# Patient Record
Sex: FEMALE | Race: WHITE | NOT HISPANIC OR LATINO | Employment: UNEMPLOYED
[De-identification: names, ages, dates, MRNs, and addresses within clinical notes are randomized per-mention and may not be internally consistent; named-entity substitution may affect disease eponyms.]

---
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
## 2023-01-03 ENCOUNTER — TELEPHONE (OUTPATIENT)
Dept: MEDICAL GROUP | Facility: CLINIC | Age: 18
End: 2023-01-03
Payer: MEDICAID

## 2023-01-03 NOTE — TELEPHONE ENCOUNTER
Patient called regarding her return to work note that she requesting last week. She said it should state she is able to return to work whenever. Thankyou

## 2023-01-09 ENCOUNTER — OFFICE VISIT (OUTPATIENT)
Dept: MEDICAL GROUP | Facility: CLINIC | Age: 18
End: 2023-01-09
Payer: MEDICAID

## 2023-01-09 DIAGNOSIS — F42.9 OBSESSIVE-COMPULSIVE DISORDER, UNSPECIFIED TYPE: ICD-10-CM

## 2023-01-09 PROCEDURE — 90834 PSYTX W PT 45 MINUTES: CPT | Performed by: PSYCHOLOGIST

## 2023-01-09 NOTE — PROGRESS NOTES
[facility]  Psychotherapy Summary Note    Full therapy note has been documented and is under restricted viewing.  Please see below for summary of today's session.     Patient Name: Kayla Duane  Patient MRN: [id number]  Today's Date:  1/9/23    Type of session: intake assessment  Session start time: 8  Session stop time: 8:45  Length of time spent face to face with patient: 45  Persons in attendance: patient    Diagnoses:   1. Obsessive-compulsive disorder, unspecified type       Pt. Is currently off work for depression and is ready for her note from her PCP to return to work.      Pt. Is a [employer] college student, interested in grad school in clinical psychology.    Lives with a friend in an apartment.  Happy with living situation.    Grew up around [place], herself got involved with drinking and has been sober for a year from alcohol.  Does use marijuana at times.    Long struggled with contamination OCD, which can spiral into depression.    No  SI.    Open to therapy for OCD/depression, but has never found therapy helpful.  Discussed ACT therapy as a possible alternative paradigm, recommended a book and I referred her to [facility], Dr. Lombardaro, and [facility].  And can f/u with this writer for support and consultation.        Na Cruz, Ph.D.

## 2023-01-13 ENCOUNTER — OFFICE VISIT (OUTPATIENT)
Dept: MEDICAL GROUP | Facility: CLINIC | Age: 18
End: 2023-01-13
Payer: MEDICAID

## 2023-01-13 DIAGNOSIS — F42.9 OBSESSIVE-COMPULSIVE DISORDER, UNSPECIFIED TYPE: ICD-10-CM

## 2023-01-13 PROCEDURE — 99213 OFFICE O/P EST LOW 20 MIN: CPT | Mod: GT,GE | Performed by: STUDENT IN AN ORGANIZED HEALTH CARE EDUCATION/TRAINING PROGRAM

## 2023-01-13 RX ORDER — FLUOXETINE 10 MG/1
10 CAPSULE ORAL DAILY
Qty: 30 CAPSULE | Refills: 2 | Status: SHIPPED | OUTPATIENT
Start: 2023-01-13 | End: 2023-02-07

## 2023-01-13 NOTE — PROGRESS NOTES
Virtual Visit: Established Patient   This visit was conducted via  telelphone  using secure and encrypted videoconferencing technology.   The patient was in their home in the [facility].    The patient's identity was confirmed and verbal consent was obtained for this virtual visit.    Subjective:   CC:   Chief Complaint   Patient presents with    Other     Telephone visit.      Kayla Duane is a 17 y.o. female presenting for evaluation and management of:    Patient is a 17 y.o. female. This pleasant patient is here today  as follow up for mental health.  She has had time off work to concentrate on her mental health. This is her 2nd visit with me, has seen Dr. CRAWFORD and has a psychiatry appointment in 3 weeks. Due to the severity of symptoms, will start on Prozac, well tolerated previously. She felt it did help, was titrate up to 20mg but then self-discontinued due to worsening of symptoms. She feels her intrusive thoughts are the same, will reach out to myself, Dr. CRAWFORD or a [facility] ER if her thoughts/symptoms worsen.   She feels ready to go back to work, forms filled out for her missed work, return to work 01/14/2023.  Will also write a separate return to work letter in addition to the [employer] forms.     ROS   Gen: no fever/chills  CV: No chest pain  Resp: No SOB  GI/: No bowel or bladder complaints  Psych: see HPI      Current medicines (including changes today)  Current Outpatient Medications   Medication Sig Dispense Refill    medroxyPROGESTERone (DEPO-PROVERA) 150 MG/ML Suspension DEPO-PROVERA 150 MG/ML SUSP 1 mL 0    traZODone (DESYREL) 50 MG Tab TAKE 1/2 TO 1 TABLET BY MOUTH AT BEDTIME AS NEEDED FOR SLEEP 30 Tablet 1    ibuprofen (MOTRIN) 600 MG Tab Take 1 Tablet by mouth every 6 hours as needed. 30 Tablet 0     No current facility-administered medications for this visit.       Patient Active Problem List    Diagnosis Date Noted    Recurrent streptococcal tonsillitis 04/26/2022        Objective:   There were no  vitals taken for this visit.    Physical Exam:limited by Telephone visit  Alert, linear, fluent speech. Low volume, a bit slow.  Speaking in full sentences, no coughing.     Assessment and Plan:   The following treatment plan was discussed:     1. Obsessive-compulsive disorder, unspecified type    RX fluoxetine, will follow up with Psychiatry.  Needs new provider in this office, knows to make appointment.  Dr. CRAWFORD and myself available if symptoms/condition worsens.    Follow-up: No follow-ups on file.

## 2023-01-13 NOTE — LETTER
January 13, 2023    To Whom It May Concern:         This is confirmation that Kayla Duane attended her scheduled appointment with Hellen Collado M.D. on 1/13/23, she is under my care and that of [facility]. She is clear to return to work on 01/14/2023.         If you have any questions please do not hesitate to call me at the phone number listed below.    Sincerely,        Electronically Signed  Hellen Collado M.D.  [phone]

## 2023-01-13 NOTE — PROGRESS NOTES
HPI:  Patient is a 17 y.o. female. This pleasant patient is here today  as follow up for mental health.  She has had time off work to concentrate on her mental health. This is her 2nd visit with me, has seen Dr. CRAWFORD and has a psychiatry appointment in 3 weeks. Due to the severity of symptoms, will start on Prozac, well tolerated previously. She felt it did help, was titrate up to 20mg but then self-discontinued due to worsening of symptoms. She feels her intrusive thoughts are the same, will reach out to myself, Dr. CRAWFORD or a [facility] if her thoughts/symptoms worsen.   She feels ready to go back to work.      No problems updated.                                                                                                                               Patient Active Problem List    Diagnosis Date Noted    Recurrent streptococcal tonsillitis 04/26/2022       Current Outpatient Medications   Medication Sig Dispense Refill    medroxyPROGESTERone (DEPO-PROVERA) 150 MG/ML Suspension DEPO-PROVERA 150 MG/ML SUSP 1 mL 0    traZODone (DESYREL) 50 MG Tab TAKE 1/2 TO 1 TABLET BY MOUTH AT BEDTIME AS NEEDED FOR SLEEP 30 Tablet 1    ibuprofen (MOTRIN) 600 MG Tab Take 1 Tablet by mouth every 6 hours as needed. 30 Tablet 0     No current facility-administered medications for this visit.         Allergies as of 01/13/2023 - Reviewed 01/13/2023   Allergen Reaction Noted    Amoxicillin Hives 08/10/2015    Penicillins Hives 02/21/2022          There were no vitals taken for this visit.    Gen: no fever/chills  CV: No chest pain  Resp: No SOB  GI/: No bowel or bladder complaints  Psych: see HPI      Physical Exam:  Limited by     Assessment and Plan    17 y.o. female with the following-  Problem List Items Addressed This Visit    None  Visit Diagnoses       Obsessive-compulsive disorder, unspecified type               No follow-ups on file.    Hellen Collado M.D.

## 2023-02-07 ENCOUNTER — OFFICE VISIT (OUTPATIENT)
Dept: BEHAVIORAL HEALTH | Facility: PSYCHIATRIC FACILITY | Age: 18
End: 2023-02-07
Payer: MEDICAID

## 2023-02-07 VITALS
HEART RATE: 67 BPM | BODY MASS INDEX: 30.39 KG/M2 | DIASTOLIC BLOOD PRESSURE: 63 MMHG | HEIGHT: 65 IN | WEIGHT: 182.4 LBS | OXYGEN SATURATION: 97 % | SYSTOLIC BLOOD PRESSURE: 119 MMHG

## 2023-02-07 DIAGNOSIS — F42.9 OBSESSIVE-COMPULSIVE DISORDER, UNSPECIFIED TYPE: ICD-10-CM

## 2023-02-07 DIAGNOSIS — F19.94 SUBSTANCE INDUCED MOOD DISORDER (HCC): ICD-10-CM

## 2023-02-07 DIAGNOSIS — F12.90 CANNABIS USE DISORDER: ICD-10-CM

## 2023-02-07 PROCEDURE — 90792 PSYCH DIAG EVAL W/MED SRVCS: CPT | Mod: GC | Performed by: STUDENT IN AN ORGANIZED HEALTH CARE EDUCATION/TRAINING PROGRAM

## 2023-02-07 ASSESSMENT — ENCOUNTER SYMPTOMS
HEMOPTYSIS: 0
FEVER: 0
DIARRHEA: 0
CONSTIPATION: 0
SHORTNESS OF BREATH: 0
NAUSEA: 0
VOMITING: 0
TREMORS: 0
CHILLS: 0
BLOOD IN STOOL: 0
BLURRED VISION: 0
PALPITATIONS: 0
HEADACHES: 1
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
## 2023-03-07 ENCOUNTER — APPOINTMENT (OUTPATIENT)
Dept: BEHAVIORAL HEALTH | Facility: PSYCHIATRIC FACILITY | Age: 18
End: 2023-03-07
Payer: MEDICAID

## 2023-03-07 DIAGNOSIS — F19.94 SUBSTANCE INDUCED MOOD DISORDER (HCC): Primary | ICD-10-CM

## 2023-03-07 DIAGNOSIS — F42.9 OBSESSIVE-COMPULSIVE DISORDER, UNSPECIFIED TYPE: ICD-10-CM

## 2023-03-17 ENCOUNTER — NON-PROVIDER VISIT (OUTPATIENT)
Dept: MEDICAL GROUP | Facility: CLINIC | Age: 18
End: 2023-03-17
Payer: MEDICAID

## 2023-03-17 DIAGNOSIS — Z30.8 ENCOUNTER FOR OTHER CONTRACEPTIVE MANAGEMENT: Primary | ICD-10-CM

## 2023-03-17 LAB
POCT INT CON NEG: NEGATIVE
POCT INT CON POS: POSITIVE
POCT URINE PREGNANCY TEST: NEGATIVE

## 2023-03-17 PROCEDURE — 96372 THER/PROPH/DIAG INJ SC/IM: CPT | Performed by: FAMILY MEDICINE

## 2023-03-17 PROCEDURE — 81025 URINE PREGNANCY TEST: CPT | Performed by: FAMILY MEDICINE

## 2023-03-17 RX ORDER — MEDROXYPROGESTERONE ACETATE 150 MG/ML
150 INJECTION, SUSPENSION INTRAMUSCULAR ONCE
Status: COMPLETED | OUTPATIENT
Start: 2023-03-17 | End: 2023-03-17

## 2023-03-17 RX ADMIN — MEDROXYPROGESTERONE ACETATE 150 MG: 150 INJECTION, SUSPENSION INTRAMUSCULAR at 10:11

## 2023-03-17 NOTE — PROGRESS NOTES
Kayla Duane is a 17 y.o. here for a Depo Provera Injection. She  had gated in 12/23/2022 , we did Pregnancy test because was note documented . Patient is in the window to get the depo shot today (march10th and mach 24th)    Date of last Depo Provera Injection: 12/23/2022 ????  Current date within therapeutic range?: Yes   Urine pregnancy test done (needed if out of date range): yes--Negative   Date of last office visit:12/23/22  Date of last pap (if > 21 years old)/ GYN exam: unknow  Dx: Family planning    Patient tolerated injection and no adverse effects were observed or reported: Yes    # of Administrations remaining in MAR: 0  Next injection due between Agust 2th and Agust 16th.

## 2023-04-04 ENCOUNTER — APPOINTMENT (OUTPATIENT)
Dept: BEHAVIORAL HEALTH | Facility: PSYCHIATRIC FACILITY | Age: 18
End: 2023-04-04
Payer: MEDICAID

## 2023-06-16 ENCOUNTER — NON-PROVIDER VISIT (OUTPATIENT)
Dept: MEDICAL GROUP | Facility: CLINIC | Age: 18
End: 2023-06-16
Payer: MEDICAID

## 2023-06-16 DIAGNOSIS — Z30.8 ENCOUNTER FOR OTHER CONTRACEPTIVE MANAGEMENT: Primary | ICD-10-CM

## 2023-06-16 PROCEDURE — 96372 THER/PROPH/DIAG INJ SC/IM: CPT | Performed by: FAMILY MEDICINE

## 2023-06-16 RX ORDER — MEDROXYPROGESTERONE ACETATE 150 MG/ML
150 INJECTION, SUSPENSION INTRAMUSCULAR ONCE
Status: COMPLETED | OUTPATIENT
Start: 2023-06-16 | End: 2023-06-16

## 2023-06-16 RX ADMIN — MEDROXYPROGESTERONE ACETATE 150 MG: 150 INJECTION, SUSPENSION INTRAMUSCULAR at 10:12

## 2023-06-16 NOTE — PROGRESS NOTES
Kayla Duane is a 17 y.o. here for a Depo Provera Injection.     Date of last Depo Provera Injection: 3/17/2023  Current date within therapeutic range?: Yes   Urine pregnancy test done (needed if out of date range): no  Date of last office visit:1/13/2023  Date of last pap (if > 21 years old)/ GYN exam: n/a  Dx: Contraceptive use    Patient tolerated injection and no adverse effects were observed or reported: Yes    # of Administrations remaining in MAR: none  Next injection due between sept 1 and sept 15.

## 2023-09-12 ENCOUNTER — OFFICE VISIT (OUTPATIENT)
Dept: MEDICAL GROUP | Facility: CLINIC | Age: 18
End: 2023-09-12
Payer: MEDICAID

## 2023-09-12 VITALS
WEIGHT: 206.3 LBS | HEIGHT: 65 IN | BODY MASS INDEX: 34.37 KG/M2 | DIASTOLIC BLOOD PRESSURE: 77 MMHG | HEART RATE: 83 BPM | TEMPERATURE: 98.3 F | OXYGEN SATURATION: 95 % | SYSTOLIC BLOOD PRESSURE: 116 MMHG

## 2023-09-12 DIAGNOSIS — R53.82 CHRONIC FATIGUE: ICD-10-CM

## 2023-09-12 DIAGNOSIS — Z30.09 BIRTH CONTROL COUNSELING: ICD-10-CM

## 2023-09-12 DIAGNOSIS — R06.83 LOUD SNORING: ICD-10-CM

## 2023-09-12 PROCEDURE — 99213 OFFICE O/P EST LOW 20 MIN: CPT | Mod: GE

## 2023-09-12 PROCEDURE — 3078F DIAST BP <80 MM HG: CPT

## 2023-09-12 PROCEDURE — 3074F SYST BP LT 130 MM HG: CPT

## 2023-09-12 RX ORDER — ALBUTEROL SULFATE 90 UG/1
AEROSOL, METERED RESPIRATORY (INHALATION)
COMMUNITY
Start: 2023-08-25

## 2023-09-12 RX ORDER — BENZONATATE 200 MG/1
CAPSULE ORAL
COMMUNITY
Start: 2023-06-21 | End: 2024-03-11

## 2023-09-12 RX ORDER — DEXTROMETHORPHAN HYDROBROMIDE AND PROMETHAZINE HYDROCHLORIDE 15; 6.25 MG/5ML; MG/5ML
SYRUP ORAL
COMMUNITY
Start: 2023-06-21 | End: 2024-03-11

## 2023-09-12 NOTE — PROGRESS NOTES
Subjective:     CC: Chronic fatigue, birth control changes    HPI:   Tianna presents today with chronic fatigue.  Patient reports that she has been dealing with chronic fatigue for which she feels like this majority of her life.  She states that she will sleep almost 10 to 12 hours every night but still feels significantly fatigued throughout the day.  She also reports chronic bilateral frontal headache pains almost daily.  She endorses loud snoring throughout the night.  Denies that she wakes up gasping for breath and denies that she has been observed to be holding her breath throughout the night.  Patient is concerned that she may have sleep apnea and would like further evaluation.  She denies history or work-up for thyroid disorders or anemia.  She does currently take trazodone 50 mg as needed for insomnia.  Patient reports that she will  have insomnia at times due to racing thoughts. She currently is not on any medications for anxiety or depression.  She does have history of OCD.     Patient reports she would like to donate her eggs and she was advised that she needs to stop Depot shots prior to starting this process. She is not in a relationship and is not currently sexually active.     Current Outpatient Medications Ordered in Epic   Medication Sig Dispense Refill    albuterol 108 (90 Base) MCG/ACT Aero Soln inhalation aerosol INHALE 2 PUFFS EVERY 4 HOURS AS NEEDED FOR COUGH FOR WHEEZE OR FOR SHORTNESS OF BREATH      medroxyPROGESTERone (DEPO-PROVERA) 150 MG/ML Suspension DEPO-PROVERA 150 MG/ML SUSP 1 mL 0    traZODone (DESYREL) 50 MG Tab TAKE 1/2 TO 1 TABLET BY MOUTH AT BEDTIME AS NEEDED FOR SLEEP 30 Tablet 1    promethazine-dextromethorphan (PROMETHAZINE-DM) 6.25-15 MG/5ML syrup TAKE 5 ML BY MOUTH AT BEDTIME FOR 7 DAYS AS NEEDED FOR COUGH (Patient not taking: Reported on 9/12/2023)      benzonatate (TESSALON) 200 MG capsule TAKE 1 CAPSULE BY MOUTH 3 TIMES A DAY FOR 5 DAYS AS NEEDED FOR COUGH (Patient not  "taking: Reported on 9/12/2023)       No current [facility]-ordered facility-administered medications on file.       ROS:  Gen: no fevers/chills, no changes in weight  Pulm: no sob, no cough  CV: no chest pain, no palpitations  GI: no nausea/vomiting, no diarrhea    Objective:     Exam:  /77 (BP Location: Left arm, Patient Position: Sitting, BP Cuff Size: Adult)   Pulse 83   Temp 36.8 °C (98.3 °F) (Temporal)   Ht 1.651 m (5' 5\")   Wt 93.6 kg (206 lb 4.8 oz)   SpO2 95%   BMI 34.33 kg/m²  Body mass index is 34.33 kg/m².    Gen: Alert and oriented, No apparent distress.  Neck: Neck is supple without lymphadenopathy.  Lungs: Normal effort, CTA bilaterally, no wheezes, rhonchi, or rales  CV: Regular rate and rhythm. No murmurs, rubs, or gallops.  Ext: No clubbing, cyanosis, edema.    Labs: None     Assessment & Plan:     18 y.o. female with the following -     1. Chronic fatigue  Pt with history of chronic fatigue. This has been occurring for years. No previous lab work up. Does have loud snoring and headaches almost daily. Pt is requesting sleep apnea tests. Will get labs for further work up as well as send for possible sleep study.   - Comp Metabolic Panel; Future  - CBC WITH DIFFERENTIAL; Future  - TSH WITH REFLEX TO FT4; Future  - Referral to Pulmonary and Sleep Medicine    2. BMI 34.0-34.9,adult  Pt has elevated BMI, has not been previously screened for cholesterol or A1c. Will order and follow up.   - HEMOGLOBIN A1C; Future  - Lipid Profile; Future    3. Loud snoring  Pt has loud snoring, chronic problem. See above, will have patient be further evaluated by Sleep Medicine.   - Referral to Pulmonary and Sleep Medicine     4. Birth Control Counseling   Patient would like to have birth control discontinued for egg donation. Advised patient to use condoms in the interim. Will discontinue depot shot for now. Follow up as needed for birth control in the future.     Nunu Hackett M.D.  PGY-2    "

## 2023-10-23 RX ORDER — ACETAMINOPHEN AND CODEINE PHOSPHATE 120; 12 MG/5ML; MG/5ML
1 SOLUTION ORAL DAILY
Qty: 28 TABLET | Refills: 3 | Status: SHIPPED | OUTPATIENT
Start: 2023-10-23 | End: 2024-03-02

## 2023-11-03 ENCOUNTER — OFFICE VISIT (OUTPATIENT)
Dept: URGENT CARE | Facility: CLINIC | Age: 18
End: 2023-11-03
Payer: MEDICAID

## 2023-11-03 VITALS
HEART RATE: 104 BPM | WEIGHT: 213.4 LBS | SYSTOLIC BLOOD PRESSURE: 102 MMHG | OXYGEN SATURATION: 96 % | RESPIRATION RATE: 12 BRPM | BODY MASS INDEX: 34.3 KG/M2 | DIASTOLIC BLOOD PRESSURE: 58 MMHG | HEIGHT: 66 IN | TEMPERATURE: 98.2 F

## 2023-11-03 DIAGNOSIS — J02.9 SORE THROAT: ICD-10-CM

## 2023-11-03 DIAGNOSIS — J06.9 VIRAL URI: Primary | ICD-10-CM

## 2023-11-03 DIAGNOSIS — R50.9 FEVER, UNSPECIFIED FEVER CAUSE: ICD-10-CM

## 2023-11-03 LAB
FLUAV RNA SPEC QL NAA+PROBE: NEGATIVE
FLUBV RNA SPEC QL NAA+PROBE: NEGATIVE
RSV RNA SPEC QL NAA+PROBE: NEGATIVE
S PYO DNA SPEC NAA+PROBE: NOT DETECTED
SARS-COV-2 RNA RESP QL NAA+PROBE: NEGATIVE

## 2023-11-03 PROCEDURE — 87637 SARSCOV2&INF A&B&RSV AMP PRB: CPT | Mod: QW | Performed by: PHYSICIAN ASSISTANT

## 2023-11-03 PROCEDURE — 87651 STREP A DNA AMP PROBE: CPT | Performed by: PHYSICIAN ASSISTANT

## 2023-11-03 PROCEDURE — 99203 OFFICE O/P NEW LOW 30 MIN: CPT | Performed by: PHYSICIAN ASSISTANT

## 2023-11-03 PROCEDURE — 3074F SYST BP LT 130 MM HG: CPT | Performed by: PHYSICIAN ASSISTANT

## 2023-11-03 PROCEDURE — 3078F DIAST BP <80 MM HG: CPT | Performed by: PHYSICIAN ASSISTANT

## 2023-11-03 ASSESSMENT — ENCOUNTER SYMPTOMS
ABDOMINAL PAIN: 1
NAUSEA: 1
DIARRHEA: 0
SHORTNESS OF BREATH: 0
MYALGIAS: 1
CHILLS: 1
VOMITING: 0
SORE THROAT: 1
HEADACHES: 1
FEVER: 1
SPUTUM PRODUCTION: 1
COUGH: 1

## 2023-11-03 NOTE — PROGRESS NOTES
Subjective     Kayla Duane is a 18 y.o. female who presents with Sore Throat (Pt has a sore throat, headache, ear discomfort, stomach pain x 3 days )    HPI:  Kayla Duane is a 18 y.o. female who presents today for evaluation of sore throat and flulike symptoms.  Patient notes that she has been feeling sick for the past 3 to 4 days.  She notes that she has had fever up to 101 °F, chills, body aches, sore throat, headache, congestion, cough, abdominal discomfort.  She has been mostly just drinking fluids and trying to rest but has occasionally taken some OTC NSAIDs to help with the headache.        Review of Systems   Constitutional:  Positive for chills, fever and malaise/fatigue.   HENT:  Positive for congestion, ear pain and sore throat.    Respiratory:  Positive for cough and sputum production. Negative for shortness of breath.    Gastrointestinal:  Positive for abdominal pain and nausea. Negative for diarrhea and vomiting.   Musculoskeletal:  Positive for myalgias.   Neurological:  Positive for headaches.           PMH:  has no past medical history on file.  MEDS:   Current Outpatient Medications:     albuterol 108 (90 Base) MCG/ACT Aero Soln inhalation aerosol, INHALE 2 PUFFS EVERY 4 HOURS AS NEEDED FOR COUGH FOR WHEEZE OR FOR SHORTNESS OF BREATH, Disp: , Rfl:     traZODone (DESYREL) 50 MG Tab, TAKE 1/2 TO 1 TABLET BY MOUTH AT BEDTIME AS NEEDED FOR SLEEP, Disp: 30 Tablet, Rfl: 1    norethindrone (MICRONOR) 0.35 MG tablet, Take 1 Tablet by mouth every day., Disp: 28 Tablet, Rfl: 3    promethazine-dextromethorphan (PROMETHAZINE-DM) 6.25-15 MG/5ML syrup, TAKE 5 ML BY MOUTH AT BEDTIME FOR 7 DAYS AS NEEDED FOR COUGH (Patient not taking: Reported on 9/12/2023), Disp: , Rfl:     benzonatate (TESSALON) 200 MG capsule, TAKE 1 CAPSULE BY MOUTH 3 TIMES A DAY FOR 5 DAYS AS NEEDED FOR COUGH (Patient not taking: Reported on 9/12/2023), Disp: , Rfl:   ALLERGIES:   Allergies   Allergen Reactions    Amoxicillin Hives     Other  "reaction(s): HIVES    Penicillins Hives     SURGHX: No past surgical history on file.  SOCHX:  reports that she has quit smoking. She has never used smokeless tobacco. She reports current alcohol use. She reports current drug use. Drug: Marijuana.  FH: Family history was reviewed, no pertinent findings to report        Objective     /58 (BP Location: Left arm, Patient Position: Sitting, BP Cuff Size: Large adult)   Pulse (!) 104   Temp 36.8 °C (98.2 °F) (Temporal)   Resp 12   Ht 1.676 m (5' 6\")   Wt 96.8 kg (213 lb 6.4 oz)   SpO2 96%   BMI 34.44 kg/m²      Physical Exam  Constitutional:       Appearance: She is well-developed.   HENT:      Head: Normocephalic and atraumatic.      Right Ear: Tympanic membrane, ear canal and external ear normal.      Left Ear: Tympanic membrane, ear canal and external ear normal.      Nose: Mucosal edema, congestion and rhinorrhea present. Rhinorrhea is clear.      Mouth/Throat:      Lips: Pink.      Pharynx: Uvula midline. Posterior oropharyngeal erythema present. No oropharyngeal exudate or uvula swelling.   Eyes:      Conjunctiva/sclera: Conjunctivae normal.      Pupils: Pupils are equal, round, and reactive to light.   Cardiovascular:      Rate and Rhythm: Normal rate and regular rhythm.      Heart sounds: Normal heart sounds. No murmur heard.  Pulmonary:      Effort: Pulmonary effort is normal.      Breath sounds: Normal breath sounds. No decreased breath sounds, wheezing, rhonchi or rales.   Musculoskeletal:      Cervical back: Normal range of motion.   Lymphadenopathy:      Cervical: Cervical adenopathy present.   Skin:     General: Skin is warm and dry.      Capillary Refill: Capillary refill takes less than 2 seconds.   Neurological:      Mental Status: She is alert and oriented to person, place, and time.   Psychiatric:         Behavior: Behavior normal.         Judgment: Judgment normal.       POCT GROUP A STREP, PCR - Negative      POCT CoV-2, Flu A/B, RSV by " PCR - Negative      Assessment & Plan     1. Sore throat  - POCT GROUP A STREP, PCR  - POCT CoV-2, Flu A/B, RSV by PCR  -Supportive care discussed to include salt water gargles, throat lozenges, and increased fluid intake    2. Fever, unspecified fever cause  - POCT GROUP A STREP, PCR  - POCT CoV-2, Flu A/B, RSV by PCR    3. Viral URI  - OTC cold/flu medications  -Supportive care also discussed to include the use of saline nasal rinses, steam inhalation, and the use of a cool-mist humidifier in the bedroom at night.  - PO fluids  - Rest  - Tylenol or ibuprofen as needed for fever > 100.4 F                    Differential Diagnosis, natural history, and supportive care discussed. Return to the Urgent Care or follow up with your PCP if symptoms fail to resolve, or for any new or worsening symptoms. Emergency room precautions discussed. Patient and/or family appears understanding of information.

## 2023-11-03 NOTE — LETTER
November 3, 2023         Patient: Kayla Duane   YOB: 2005   Date of Visit: 11/3/2023           To Whom it May Concern:    Kayla Duane was seen in my clinic on 11/3/2023.  Please excuse her absence from work tomorrow.  She may return to work on Sunday, 11/5/2023, as long as she has been fever free for a full 24 hours without the use of fever reducing medications.      Sincerely,           Micaela Webster P.A.-C.  Electronically Signed

## 2023-11-03 NOTE — LETTER
November 3, 2023         Patient: Kayla Duane   YOB: 2005   Date of Visit: 11/3/2023           To Whom it May Concern:    Kayla Duane was seen in my clinic on 11/3/2023.  Please excuse her absence from school today.      Sincerely,           Micaela Webster P.A.-C.  Electronically Signed

## 2024-01-05 ENCOUNTER — APPOINTMENT (OUTPATIENT)
Dept: URGENT CARE | Facility: CLINIC | Age: 19
End: 2024-01-05
Payer: MEDICAID

## 2024-02-27 NOTE — TELEPHONE ENCOUNTER
Received request via: Pharmacy    Was the patient seen in the last year in this department? Yes    Does the patient have an active prescription (recently filled or refills available) for medication(s) requested? No    Pharmacy Name: [facility]    Does the patient have [facility] Plus and need 100 day supply (blood pressure, diabetes and cholesterol meds only)? Patient does not have SCP

## 2024-03-02 RX ORDER — NORETHINDRONE 0.35 MG/1
1 TABLET ORAL
Qty: 28 TABLET | Refills: 3 | Status: SHIPPED | OUTPATIENT
Start: 2024-03-02 | End: 2024-03-11

## 2024-03-09 ENCOUNTER — OFFICE VISIT (OUTPATIENT)
Dept: URGENT CARE | Facility: CLINIC | Age: 19
End: 2024-03-09
Payer: MEDICAID

## 2024-03-09 VITALS
TEMPERATURE: 98.2 F | OXYGEN SATURATION: 97 % | RESPIRATION RATE: 16 BRPM | SYSTOLIC BLOOD PRESSURE: 106 MMHG | DIASTOLIC BLOOD PRESSURE: 62 MMHG | WEIGHT: 209.9 LBS | BODY MASS INDEX: 33.73 KG/M2 | HEIGHT: 66 IN | HEART RATE: 85 BPM

## 2024-03-09 DIAGNOSIS — R21 RASH AND NONSPECIFIC SKIN ERUPTION: ICD-10-CM

## 2024-03-09 DIAGNOSIS — J03.90 TONSILLITIS: ICD-10-CM

## 2024-03-09 DIAGNOSIS — B33.8 RSV INFECTION: ICD-10-CM

## 2024-03-09 LAB
FLUAV RNA SPEC QL NAA+PROBE: NEGATIVE
FLUBV RNA SPEC QL NAA+PROBE: NEGATIVE
RSV RNA SPEC QL NAA+PROBE: POSITIVE
S PYO DNA SPEC NAA+PROBE: NOT DETECTED
SARS-COV-2 RNA RESP QL NAA+PROBE: NEGATIVE

## 2024-03-09 PROCEDURE — 87637 SARSCOV2&INF A&B&RSV AMP PRB: CPT | Mod: QW | Performed by: STUDENT IN AN ORGANIZED HEALTH CARE EDUCATION/TRAINING PROGRAM

## 2024-03-09 PROCEDURE — 3074F SYST BP LT 130 MM HG: CPT | Performed by: STUDENT IN AN ORGANIZED HEALTH CARE EDUCATION/TRAINING PROGRAM

## 2024-03-09 PROCEDURE — 3078F DIAST BP <80 MM HG: CPT | Performed by: STUDENT IN AN ORGANIZED HEALTH CARE EDUCATION/TRAINING PROGRAM

## 2024-03-09 PROCEDURE — 99213 OFFICE O/P EST LOW 20 MIN: CPT | Performed by: STUDENT IN AN ORGANIZED HEALTH CARE EDUCATION/TRAINING PROGRAM

## 2024-03-09 PROCEDURE — 87651 STREP A DNA AMP PROBE: CPT | Performed by: STUDENT IN AN ORGANIZED HEALTH CARE EDUCATION/TRAINING PROGRAM

## 2024-03-09 RX ORDER — DULOXETIN HYDROCHLORIDE 20 MG/1
CAPSULE, DELAYED RELEASE ORAL
COMMUNITY
Start: 2024-02-29

## 2024-03-09 NOTE — PROGRESS NOTES
"Subjective:   Kayla Duane is a 18 y.o. female who presents for Rash (X3 days developed rash on chest and abdomin/ vomiting/ nausea/ cough/ congestion/ phlegm is black, green, brown/ headache/ body aches/ chills/ took covid test yesterday and was negative at home )      HPI:  18-year-old female presents to urgent care for 3 days of a rash to her abdomen and chest.  Also has had some nausea and vomiting.  No vomiting today.  Also experiencing a cough, sore throat, headache, and mild nasal congestion.  No one else at home with similar symptoms.  States that she does have a long history of strep throat.      Medications:    albuterol Aers  benzonatate  DULoxetine Cpep  Dary Tabs  promethazine-dextromethorphan  traZODone Tabs    Allergies: Amoxicillin and Penicillins    Problem List: Kayla Duane does not have any pertinent problems on file.    Surgical History:  No past surgical history on file.    Past Social Hx: Kayla Duane  reports that she has quit smoking. She has never used smokeless tobacco. She reports that she does not currently use alcohol. She reports current drug use. Drug: Marijuana.     Past Family Hx:  Kayla Duane family history is not on file.     Problem list, medications, and allergies reviewed by myself today in Epic.     Objective:     /62 (BP Location: Left arm, Patient Position: Sitting, BP Cuff Size: Adult)   Pulse 85   Temp 36.8 °C (98.2 °F) (Temporal)   Resp 16   Ht 1.676 m (5' 6\")   Wt 95.2 kg (209 lb 14.4 oz)   SpO2 97%   BMI 33.88 kg/m²     Physical Exam  Vitals reviewed.   Constitutional:       Appearance: Normal appearance.   HENT:      Right Ear: Tympanic membrane, ear canal and external ear normal.      Left Ear: Tympanic membrane, ear canal and external ear normal.      Nose: Congestion present.      Mouth/Throat:      Mouth: Mucous membranes are moist.      Pharynx: Uvula midline. Posterior oropharyngeal erythema present. No oropharyngeal exudate.      Tonsils: No tonsillar " exudate. 1+ on the right. 1+ on the left.   Cardiovascular:      Rate and Rhythm: Normal rate and regular rhythm.      Pulses: Normal pulses.      Heart sounds: Normal heart sounds. No murmur heard.  Pulmonary:      Effort: Pulmonary effort is normal. No respiratory distress.      Breath sounds: Normal breath sounds. No stridor. No wheezing, rhonchi or rales.   Musculoskeletal:      Cervical back: Normal range of motion.   Lymphadenopathy:      Cervical: Cervical adenopathy present.   Neurological:      Mental Status: She is alert.         Lab Results/POC Test Results   Results for orders placed or performed in visit on 03/09/24   POCT GROUP A STREP, PCR   Result Value Ref Range    POC Group A Strep, PCR Not Detected Not Detected, Invalid   POCT CoV-2, Flu A/B, RSV by PCR   Result Value Ref Range    SARS-CoV-2 by PCR Negative Negative, Invalid    Influenza virus A RNA Negative Negative, Invalid    Influenza virus B, PCR Negative Negative, Invalid    RSV, PCR Positive (A) Negative, Invalid           Assessment/Plan:     Diagnosis and associated orders:     1. Tonsillitis  POCT GROUP A STREP, PCR    POCT CoV-2, Flu A/B, RSV by PCR      2. Rash and nonspecific skin eruption        3. RSV infection           Comments/MDM:     RSV positive.  Patient's presentation physical exam findings are consistent with this diagnosis.  This should be self-limited.  No indication for antibiotics at this time.  Rash consistent with viral exanthem.  Rash not consistent with bacterial infection or fungal.  No vesicular rash.  Pulmonary exam shows no wheezing, rales, rhonchi.  No signs of pneumonia.  Discussed typical timeframe for resolution of symptoms.  Discussed home supportive care.  Vitals are stable.  No respiratory distress or chest pain.  Return precautions given.         Differential diagnosis, natural history, supportive care, and indications for immediate follow-up discussed.    Advised the patient to follow-up with the  primary care physician for recheck, reevaluation, and consideration of further management.    Please note that this dictation was created using voice recognition software. I have made a reasonable attempt to correct obvious errors, but I expect that there are errors of grammar and possibly content that I did not discover before finalizing the note.    Electronically signed by Raman Ash PA-C.

## 2024-03-09 NOTE — LETTER
March 9, 2024    To Whom It May Concern:         This is confirmation that Kayla Duane attended her scheduled appointment with Raman Ash P.A.-C. on 3/09/24.  Excused from work on 3/9/2024 through 3/10/2024.  May return to work early if feeling better.         If you have any questions please do not hesitate to call me at the phone number listed below.    Sincerely,          Raman Ash P.A.-C.  [phone]

## 2024-03-11 ENCOUNTER — OFFICE VISIT (OUTPATIENT)
Dept: URGENT CARE | Facility: CLINIC | Age: 19
End: 2024-03-11
Payer: MEDICAID

## 2024-03-11 VITALS
HEIGHT: 66 IN | BODY MASS INDEX: 33.59 KG/M2 | HEART RATE: 81 BPM | TEMPERATURE: 96.9 F | OXYGEN SATURATION: 98 % | SYSTOLIC BLOOD PRESSURE: 122 MMHG | WEIGHT: 209 LBS | DIASTOLIC BLOOD PRESSURE: 68 MMHG | RESPIRATION RATE: 16 BRPM

## 2024-03-11 DIAGNOSIS — B33.8 RSV (RESPIRATORY SYNCYTIAL VIRUS INFECTION): ICD-10-CM

## 2024-03-11 PROCEDURE — 99212 OFFICE O/P EST SF 10 MIN: CPT | Performed by: FAMILY MEDICINE

## 2024-03-11 PROCEDURE — 3074F SYST BP LT 130 MM HG: CPT | Performed by: FAMILY MEDICINE

## 2024-03-11 PROCEDURE — 3078F DIAST BP <80 MM HG: CPT | Performed by: FAMILY MEDICINE

## 2024-03-11 ASSESSMENT — ENCOUNTER SYMPTOMS
COUGH: 1
FEVER: 0

## 2024-03-11 NOTE — PROGRESS NOTES
"Subjective:     Kayla Fay-Ann Duane is a 18 y.o. female who presents for Cough (Diagnosed with RSV on Saturday, needs school note ) and Letter for School/Work    HPI  Patient here for school note  Has been evaluated in this office a few days ago and diagnosed with RSV  Still feeling poorly and needs note for school  Overall does feel she is making improvements  Continues to have cough and stuffy nose  No new symptoms since last visit    Review of Systems   Constitutional:  Negative for fever.   Respiratory:  Positive for cough.    Skin:  Negative for rash.     PMH:  has no past medical history on file.  MEDS:   Current Outpatient Medications:     DULoxetine (CYMBALTA) 20 MG Cap DR Particles, TAKE 1 CAPSULE ORALLY DAILY FOR MOOD/ANXIETY, Disp: , Rfl:     albuterol 108 (90 Base) MCG/ACT Aero Soln inhalation aerosol, INHALE 2 PUFFS EVERY 4 HOURS AS NEEDED FOR COUGH FOR WHEEZE OR FOR SHORTNESS OF BREATH, Disp: , Rfl:     traZODone (DESYREL) 50 MG Tab, TAKE 1/2 TO 1 TABLET BY MOUTH AT BEDTIME AS NEEDED FOR SLEEP, Disp: 30 Tablet, Rfl: 1  ALLERGIES:   Allergies   Allergen Reactions    Amoxicillin Hives     Other reaction(s): HIVES    Penicillins Hives     SURGHX: No past surgical history on file.  SOCHX:  reports that she has quit smoking. She has never used smokeless tobacco. She reports that she does not currently use alcohol. She reports current drug use. Drug: Marijuana.     Objective:   /68   Pulse 81   Temp 36.1 °C (96.9 °F)   Resp 16   Ht 1.676 m (5' 6\")   Wt 94.8 kg (209 lb)   SpO2 98%   BMI 33.73 kg/m²     Physical Exam  Constitutional:       General: She is not in acute distress.     Appearance: She is well-developed. She is not diaphoretic.   Pulmonary:      Effort: Pulmonary effort is normal.   Neurological:      Mental Status: She is alert.         Assessment/Plan:   Assessment    1. RSV (respiratory syncytial virus infection)    Patient with RSV.  She is overall making improvements, however " requires a note for school.  Note was given and reviewed expected course of recovery for RSV.  All questions answered and will follow-up in urgent care as needed.

## 2024-03-11 NOTE — LETTER
March 11, 2024    To Whom It May Concern:         This is confirmation that Kayla Fay-Ann Duane attended her scheduled appointment with Daniel Thompson M.D. on 3/11/24.  Please excuse her from missed classes.  She is anticipated to miss class tomorrow and Wednesday.  She is likely going to be well enough to return 3/14/24.  Thank you for making accommodations as she recovers.           If you have any questions please do not hesitate to call me at the phone number listed below.    Sincerely,          Daniel Thompson M.D.  [phone]

## 2024-05-20 PROCEDURE — RXMED WILLOW AMBULATORY MEDICATION CHARGE: Performed by: NURSE PRACTITIONER

## 2024-05-23 ENCOUNTER — PHARMACY VISIT (OUTPATIENT)
Dept: PHARMACY | Facility: MEDICAL CENTER | Age: 19
End: 2024-05-23
Payer: COMMERCIAL

## 2024-06-06 ENCOUNTER — OFFICE VISIT (OUTPATIENT)
Dept: URGENT CARE | Facility: CLINIC | Age: 19
End: 2024-06-06
Payer: MEDICAID

## 2024-06-06 VITALS
RESPIRATION RATE: 16 BRPM | WEIGHT: 200 LBS | DIASTOLIC BLOOD PRESSURE: 80 MMHG | HEART RATE: 65 BPM | BODY MASS INDEX: 33.32 KG/M2 | HEIGHT: 65 IN | TEMPERATURE: 97.3 F | SYSTOLIC BLOOD PRESSURE: 126 MMHG | OXYGEN SATURATION: 94 %

## 2024-06-06 DIAGNOSIS — H10.32 ACUTE CONJUNCTIVITIS OF LEFT EYE, UNSPECIFIED ACUTE CONJUNCTIVITIS TYPE: ICD-10-CM

## 2024-06-06 PROCEDURE — 3074F SYST BP LT 130 MM HG: CPT

## 2024-06-06 PROCEDURE — 3079F DIAST BP 80-89 MM HG: CPT

## 2024-06-06 PROCEDURE — 99213 OFFICE O/P EST LOW 20 MIN: CPT

## 2024-06-06 RX ORDER — ERYTHROMYCIN 5 MG/G
1 OINTMENT OPHTHALMIC 4 TIMES DAILY
Qty: 3.5 G | Refills: 0 | Status: SHIPPED | OUTPATIENT
Start: 2024-06-06 | End: 2024-06-06

## 2024-06-06 RX ORDER — ERYTHROMYCIN 5 MG/G
1 OINTMENT OPHTHALMIC 4 TIMES DAILY
Qty: 3.5 G | Refills: 0 | Status: SHIPPED | OUTPATIENT
Start: 2024-06-06 | End: 2024-06-13

## 2024-06-06 ASSESSMENT — ENCOUNTER SYMPTOMS
PHOTOPHOBIA: 0
EYE REDNESS: 1
DOUBLE VISION: 0
BLURRED VISION: 0
EYE DISCHARGE: 1
EYE PAIN: 1

## 2024-06-06 NOTE — PROGRESS NOTES
Subjective:   Kayla Fay Ann Duane is a 18 y.o. female who presents for Stye (L eye)      HPI: This is a 18-year-old female who presents today for left eye pain and discharge.  This is a new problem.  Patient reports developing symptoms yesterday.  She reports large amounts of discharge from her left eye and swelling.  She has not take anything for her symptoms.  She denies any contact lens use.      Review of Systems   Eyes:  Positive for pain, discharge and redness. Negative for blurred vision, double vision and photophobia.       Medications:    Current Outpatient Medications on File Prior to Visit   Medication Sig Dispense Refill    Esketamine HCl, 56 MG Dose, (SPRAVATO, 56 MG DOSE,) 28 MG/DEVICE Solution Therapy Pack Inhale 2 sprays twice a week by intranasal route in the morning for 14 days, for DEPRESSION/SUICIDALITY. 4 Each 2    propranolol (INDERAL) 20 MG Tab TAKE 0.5 - 1 TABLET ORALLY DAILY AS NEEDED FOR ANXIETY      Dextromethorphan-Bupropion (AUVELITY PO) Take  by mouth.      DULoxetine (CYMBALTA) 20 MG Cap DR Particles TAKE 1 CAPSULE ORALLY DAILY FOR MOOD/ANXIETY      albuterol 108 (90 Base) MCG/ACT Aero Soln inhalation aerosol INHALE 2 PUFFS EVERY 4 HOURS AS NEEDED FOR COUGH FOR WHEEZE OR FOR SHORTNESS OF BREATH      traZODone (DESYREL) 50 MG Tab TAKE 1/2 TO 1 TABLET BY MOUTH AT BEDTIME AS NEEDED FOR SLEEP 30 Tablet 1     No current facility-administered medications on file prior to visit.        Allergies:   Amoxicillin and Penicillins    Problem List:   Patient Active Problem List   Diagnosis    Recurrent streptococcal tonsillitis    Obsessive-compulsive disorder        Surgical History:  No past surgical history on file.    Past Social Hx:   Social History     Tobacco Use    Smoking status: Former    Smokeless tobacco: Never   Vaping Use    Vaping status: Former    Substances: Nicotine    Devices: Disposable   Substance Use Topics    Alcohol use: Not Currently    Drug use: Yes     Types: Marijuana     "      Problem list, medications, and allergies reviewed by myself today in Epic.     Objective:     /80   Pulse 65   Temp 36.3 °C (97.3 °F) (Temporal)   Resp 16   Ht 1.651 m (5' 5\")   Wt 90.7 kg (200 lb)   SpO2 94%   BMI 33.28 kg/m²     Physical Exam  Vitals and nursing note reviewed.   Constitutional:       General: She is not in acute distress.     Appearance: Normal appearance. She is normal weight. She is not ill-appearing, toxic-appearing or diaphoretic.   HENT:      Head: Normocephalic and atraumatic.      Right Ear: Tympanic membrane, ear canal and external ear normal. There is no impacted cerumen.      Left Ear: Tympanic membrane, ear canal and external ear normal. There is no impacted cerumen.   Eyes:      General:         Right eye: No discharge or hordeolum.         Left eye: No discharge or hordeolum.      Extraocular Movements: Extraocular movements intact.      Conjunctiva/sclera:      Left eye: Left conjunctiva is injected.      Pupils: Pupils are equal, round, and reactive to light.      Comments: Mild swelling noted to left eye   Cardiovascular:      Rate and Rhythm: Normal rate and regular rhythm.      Pulses: Normal pulses.      Heart sounds: Normal heart sounds. No murmur heard.     No friction rub. No gallop.   Pulmonary:      Effort: Pulmonary effort is normal. No respiratory distress.      Breath sounds: Normal breath sounds. No stridor. No wheezing, rhonchi or rales.   Chest:      Chest wall: No tenderness.   Musculoskeletal:      Cervical back: Neck supple. No tenderness.   Lymphadenopathy:      Cervical: No cervical adenopathy.   Skin:     General: Skin is warm and dry.      Capillary Refill: Capillary refill takes less than 2 seconds.   Neurological:      General: No focal deficit present.      Mental Status: She is alert and oriented to person, place, and time. Mental status is at baseline.      Cranial Nerves: No cranial nerve deficit.      Motor: No weakness.      Gait: " Gait normal.   Psychiatric:         Mood and Affect: Mood normal.         Behavior: Behavior normal.         Thought Content: Thought content normal.         Judgment: Judgment normal.         Assessment/Plan:     Diagnosis and associated orders:   1. Acute conjunctivitis of left eye, unspecified acute conjunctivitis type  erythromycin 5 MG/GM Ointment    DISCONTINUED: erythromycin 5 MG/GM Ointment          Comments/MDM:   Pt is clinically stable at today's acute urgent care visit.  No acute distress noted. Appropriate for outpatient management at this time.     Acute problem.  Discussed differentials with patient to include bacterial versus viral conjunctivitis.  The patient will be started on erythromycin ointment to cover any bacterial etiologies.  I have recommended warm compresses for comfort.  The patient is to return for any new or worsening signs or symptoms or signs of fail to improve           Discussed DDx, management options (risks,benefits, and alternatives to planned treatment), natural progression and supportive care.  Expressed understanding and the treatment plan was agreed upon. Questions were encouraged and answered   Return to urgent care prn if new or worsening sx or if there is no improvement in condition prn.    Educated in Red flags and indications to immediately call 911 or present to the Emergency Department.   Advised the patient to follow-up with the primary care physician for recheck, reevaluation, and consideration of further management.    I personally reviewed prior external notes and test results pertinent to today's visit.  I have independently reviewed and interpreted all diagnostics ordered during this urgent care acute visit.       Please note that this dictation was created using voice recognition software. I have made a reasonable attempt to correct obvious errors, but I expect that there are errors of grammar and possibly content that I did not discover before finalizing the  note.    This note was electronically signed by JÚNIOR Martines

## 2024-08-12 PROCEDURE — RXMED WILLOW AMBULATORY MEDICATION CHARGE: Performed by: NURSE PRACTITIONER

## 2024-08-13 ENCOUNTER — PHARMACY VISIT (OUTPATIENT)
Dept: PHARMACY | Facility: MEDICAL CENTER | Age: 19
End: 2024-08-13
Payer: COMMERCIAL

## 2024-08-19 PROCEDURE — RXMED WILLOW AMBULATORY MEDICATION CHARGE: Performed by: NURSE PRACTITIONER

## 2024-08-20 ENCOUNTER — PHARMACY VISIT (OUTPATIENT)
Dept: PHARMACY | Facility: MEDICAL CENTER | Age: 19
End: 2024-08-20
Payer: COMMERCIAL

## 2024-09-11 ENCOUNTER — PHARMACY VISIT (OUTPATIENT)
Dept: PHARMACY | Facility: MEDICAL CENTER | Age: 19
End: 2024-09-11
Payer: COMMERCIAL

## 2024-09-11 PROCEDURE — RXMED WILLOW AMBULATORY MEDICATION CHARGE: Performed by: NURSE PRACTITIONER

## 2024-09-27 PROCEDURE — RXMED WILLOW AMBULATORY MEDICATION CHARGE: Performed by: NURSE PRACTITIONER

## 2024-09-30 ENCOUNTER — PHARMACY VISIT (OUTPATIENT)
Dept: PHARMACY | Facility: MEDICAL CENTER | Age: 19
End: 2024-09-30
Payer: COMMERCIAL

## 2024-10-08 ENCOUNTER — OFFICE VISIT (OUTPATIENT)
Dept: MEDICAL GROUP | Facility: CLINIC | Age: 19
End: 2024-10-08
Payer: MEDICAID

## 2024-10-08 VITALS
HEART RATE: 102 BPM | WEIGHT: 202 LBS | TEMPERATURE: 97.8 F | SYSTOLIC BLOOD PRESSURE: 114 MMHG | OXYGEN SATURATION: 96 % | BODY MASS INDEX: 32.47 KG/M2 | DIASTOLIC BLOOD PRESSURE: 60 MMHG | HEIGHT: 66 IN

## 2024-10-08 DIAGNOSIS — M79.641 CHRONIC PAIN OF RIGHT HAND: ICD-10-CM

## 2024-10-08 DIAGNOSIS — G89.29 CHRONIC PAIN OF RIGHT HAND: ICD-10-CM

## 2024-10-08 PROCEDURE — 3074F SYST BP LT 130 MM HG: CPT

## 2024-10-08 PROCEDURE — 3078F DIAST BP <80 MM HG: CPT

## 2024-10-08 PROCEDURE — 99213 OFFICE O/P EST LOW 20 MIN: CPT | Mod: GE

## 2024-10-08 ASSESSMENT — PATIENT HEALTH QUESTIONNAIRE - PHQ9
CLINICAL INTERPRETATION OF PHQ2 SCORE: 5
SUM OF ALL RESPONSES TO PHQ QUESTIONS 1-9: 20
5. POOR APPETITE OR OVEREATING: 3 - NEARLY EVERY DAY

## 2024-10-14 ENCOUNTER — TELEPHONE (OUTPATIENT)
Dept: MEDICAL GROUP | Facility: CLINIC | Age: 19
End: 2024-10-14
Payer: MEDICAID

## 2024-10-15 ENCOUNTER — PHARMACY VISIT (OUTPATIENT)
Dept: PHARMACY | Facility: MEDICAL CENTER | Age: 19
End: 2024-10-15
Payer: COMMERCIAL

## 2024-10-15 PROCEDURE — RXMED WILLOW AMBULATORY MEDICATION CHARGE: Performed by: NURSE PRACTITIONER

## 2024-10-24 DIAGNOSIS — G89.29 CHRONIC PAIN OF RIGHT HAND: ICD-10-CM

## 2024-10-24 DIAGNOSIS — M79.641 CHRONIC PAIN OF RIGHT HAND: ICD-10-CM

## 2024-10-28 ENCOUNTER — PHARMACY VISIT (OUTPATIENT)
Dept: PHARMACY | Facility: MEDICAL CENTER | Age: 19
End: 2024-10-28
Payer: COMMERCIAL

## 2024-10-28 PROCEDURE — RXMED WILLOW AMBULATORY MEDICATION CHARGE: Performed by: NURSE PRACTITIONER

## 2024-12-03 PROCEDURE — RXMED WILLOW AMBULATORY MEDICATION CHARGE: Performed by: NURSE PRACTITIONER

## 2024-12-05 ENCOUNTER — PHARMACY VISIT (OUTPATIENT)
Dept: PHARMACY | Facility: MEDICAL CENTER | Age: 19
End: 2024-12-05
Payer: COMMERCIAL

## 2024-12-20 PROCEDURE — RXMED WILLOW AMBULATORY MEDICATION CHARGE: Performed by: NURSE PRACTITIONER

## 2024-12-22 ENCOUNTER — PHARMACY VISIT (OUTPATIENT)
Dept: PHARMACY | Facility: MEDICAL CENTER | Age: 19
End: 2024-12-22
Payer: COMMERCIAL

## 2025-01-13 ENCOUNTER — PHARMACY VISIT (OUTPATIENT)
Dept: PHARMACY | Facility: MEDICAL CENTER | Age: 20
End: 2025-01-13
Payer: COMMERCIAL

## 2025-01-13 PROCEDURE — RXMED WILLOW AMBULATORY MEDICATION CHARGE: Performed by: NURSE PRACTITIONER

## 2025-01-13 RX ORDER — ESKETAMINE HYDROCHLORIDE 28 MG/.2ML
SOLUTION NASAL
Qty: 4 EACH | Refills: 4 | OUTPATIENT
Start: 2025-01-13

## 2025-02-11 ENCOUNTER — PHARMACY VISIT (OUTPATIENT)
Dept: PHARMACY | Facility: MEDICAL CENTER | Age: 20
End: 2025-02-11
Payer: COMMERCIAL

## 2025-02-11 PROCEDURE — RXMED WILLOW AMBULATORY MEDICATION CHARGE: Performed by: NURSE PRACTITIONER
